# Patient Record
Sex: FEMALE | Race: WHITE | Employment: FULL TIME | URBAN - NONMETROPOLITAN AREA
[De-identification: names, ages, dates, MRNs, and addresses within clinical notes are randomized per-mention and may not be internally consistent; named-entity substitution may affect disease eponyms.]

---

## 2020-12-17 ENCOUNTER — OFFICE VISIT (OUTPATIENT)
Dept: OBGYN CLINIC | Age: 50
End: 2020-12-17
Payer: COMMERCIAL

## 2020-12-17 VITALS
DIASTOLIC BLOOD PRESSURE: 74 MMHG | SYSTOLIC BLOOD PRESSURE: 114 MMHG | WEIGHT: 202.6 LBS | HEIGHT: 68 IN | BODY MASS INDEX: 30.71 KG/M2

## 2020-12-17 PROCEDURE — 99396 PREV VISIT EST AGE 40-64: CPT | Performed by: OBSTETRICS & GYNECOLOGY

## 2020-12-17 NOTE — PROGRESS NOTES
DATE OF VISIT:  20  PATIENT NAME:  Tigre Beltran     YOB: 1970    53 y.o. Chief Complaint   Patient presents with    Gynecologic Exam     Pap NL 2018, Mammogram 2020. Pt denies concerns. No LMP recorded. Patient has had a hysterectomy. Primary Care Physician: Korina Sotomayor    The patient was seen and examined. She has no chiefcomplaint today and is here for her annual exam.  Her bowels are regular. There are no voiding complaints. She denies any bloating. She denies vaginal discharge and was counseled on STD's and the need for barriercontraception.      HPI : Tigre Beltran is a 53 y.o. female  who presents today for her annual.    ______________________________________________________________________    OB History    Para Term  AB Living   3 3       3   SAB TAB Ectopic Molar Multiple Live Births             3      # Outcome Date GA Lbr Abdirashid/2nd Weight Sex Delivery Anes PTL Lv   3 Para            2 Para            1 Para              Past Medical History:   Diagnosis Date    Abnormal Pap smear of cervix     history of cryo in [facility]    Thyroid disease                                                                    Past Surgical History:   Procedure Laterality Date    ANKLE SURGERY Left 2017    fracture    ARM SURGERY      HYSTERECTOMY, TOTAL ABDOMINAL      w/o BSO     Family History   Problem Relation Age of Onset    Heart Attack Father     Cervical Cancer Mother     Hypertension Brother      Social History     Socioeconomic History    Marital status:      Spouse name: Not on file    Number of children: Not on file    Years of education: Not on file    Highest education level: Not on file   Occupational History    Not on file   Social Needs    Financial resource strain: Not on file    Food insecurity     Worry: Not on file     Inability: Not on file    Transportation needs     Medical: Not on file Non-medical: Not on file   Tobacco Use    Smoking status: Never Smoker    Smokeless tobacco: Never Used   Substance and Sexual Activity    Alcohol use: Yes     Comment: occasional    Drug use: Never    Sexual activity: Yes     Partners: Male     Birth control/protection: Surgical     Comment: vasectomy   Lifestyle    Physical activity     Days per week: Not on file     Minutes per session: Not on file    Stress: Not on file   Relationships    Social connections     Talks on phone: Not on file     Gets together: Not on file     Attends Nondenominational service: Not on file     Active member of club or organization: Not on file     Attends meetings of clubs or organizations: Not on file     Relationship status: Not on file    Intimate partner violence     Fear of current or ex partner: Not on file     Emotionally abused: Not on file     Physically abused: Not on file     Forced sexual activity: Not on file   Other Topics Concern    Not on file   Social History Narrative    Not on file     Vitals:    12/17/20 0937   BP: 114/74   Site: Right Upper Arm   Position: Sitting   Weight: 202 lb 9.6 oz (91.9 kg)   Height: 5' 8\" (1.727 m)     Body mass index is 30.81 kg/m². No LMP recorded. Patient has had a hysterectomy. MEDICATIONS:  Current Outpatient Medications   Medication Sig Dispense Refill    Levothyroxine Sodium (SYNTHROID PO) Take by mouth       No current facility-administered medications for this visit. ALLERGIES:  Allergies as of 12/17/2020    (No Known Allergies)           Symptoms of decreased mood absent    **If either question is answered in a  positive fashion then completethe PHQ9 Scoring Evaluation and make the appropriate referral**      Gynecologic History:       No LMP recorded. Patient has had a hysterectomy. Sexually Active: Yes    STD History: No     Permanent Sterilization:Yes hyst   Reversible Birth Control: No        Hormone Replacement Exposure:  No Genetic Qualified Family History of Breast, Ovarian , Colon or Uterine Cancer:No   If YES see scanned worksheet. Preventative Health Testing:  Colposcopy History:   Date of Last Mammogram: 2020  Date of Last Colonoscopy:   Date of Last Bone Density:      ______________________________________________________________________    REVIEW OF SYSTEMS:       Review of Systems   Constitutional: Negative for chills, fatigue and fever. Respiratory: Negative for shortness of breath. Cardiovascular: Negative for chest pain. Gastrointestinal: Negative for abdominal pain, constipation and diarrhea. Genitourinary: Negative for dysuria, enuresis, frequency, menstrual problem, pelvic pain, urgency and vaginal bleeding. Neurological: Negative for dizziness, light-headedness and headaches. PHYSICAL EXAM:    Physical Exam  Constitutional:       Appearance: Normal appearance. Genitourinary:      Pelvic exam was performed with patient in the lithotomy position. Vulva, vagina, right adnexa and left adnexa normal.      Cervix is absent. Uterus is absent. HENT:      Head: Atraumatic. Mouth/Throat:      Mouth: Mucous membranes are moist.   Eyes:      Extraocular Movements: Extraocular movements intact. Pupils: Pupils are equal, round, and reactive to light. Neck:      Musculoskeletal: Normal range of motion. Cardiovascular:      Rate and Rhythm: Normal rate. Pulmonary:      Effort: Pulmonary effort is normal.   Chest:      Breasts:         Right: Normal.         Left: Normal.   Abdominal:      General: There is no distension. Palpations: Abdomen is soft. Tenderness: There is no abdominal tenderness. Musculoskeletal: Normal range of motion. Neurological:      Mental Status: She is alert and oriented to person, place, and time. Skin:     General: Skin is warm and dry.    Psychiatric:         Mood and Affect: Mood normal.         Behavior: Behavior normal. Thought Content: Thought content normal.         Judgment: Judgment normal.   Chaperone present: chaperone declined. POC Cultures:  No results found for this visit on 12/17/20. ASSESSMENT:      53 y.o. Annual  1. Women's annual routine gynecological examination          There are no active problems to display for this patient. Hereditary Breast, Ovarian, Colon and Uterine Cancer screening Done. Tobacco & Secondary smoke risks reviewed; instructed on cessation and avoidance    PLAN:    Return in about 1 year (around 12/17/2021) for annual.  No orders of the defined types were placed in this encounter. Repeat Annual every 1 year  Cervical Cytology Evaluation begins at 24years old. If Negative Cytology, Follow-up screening per current guidelines. Mammograms every 1year. If 35 yo and last mammogram was negative. Calcium and Vitamin D dosing reviewed. Colonoscopy screening reviewed as well as onset for bone density testing. Birth control and barrier recommendationsdiscussed. STD counseling and prevention reviewed. Gardisil counseling completed for all patients 7-35 yo. Routine healthmaintenance per patients PCP.     Electronicallysigned by:  Tone Albarran DO on 12/26/20

## 2020-12-26 ASSESSMENT — ENCOUNTER SYMPTOMS
DIARRHEA: 0
SHORTNESS OF BREATH: 0
ABDOMINAL PAIN: 0
CONSTIPATION: 0

## 2023-10-25 ENCOUNTER — OFFICE VISIT (OUTPATIENT)
Dept: OBGYN CLINIC | Age: 53
End: 2023-10-25
Payer: COMMERCIAL

## 2023-10-25 VITALS
SYSTOLIC BLOOD PRESSURE: 120 MMHG | BODY MASS INDEX: 41.44 KG/M2 | DIASTOLIC BLOOD PRESSURE: 88 MMHG | WEIGHT: 264 LBS | HEIGHT: 67 IN

## 2023-10-25 DIAGNOSIS — Z01.419 WOMEN'S ANNUAL ROUTINE GYNECOLOGICAL EXAMINATION: Primary | ICD-10-CM

## 2023-10-25 PROCEDURE — 99396 PREV VISIT EST AGE 40-64: CPT | Performed by: OBSTETRICS & GYNECOLOGY

## 2023-10-25 RX ORDER — ESCITALOPRAM OXALATE 10 MG/1
TABLET ORAL
COMMUNITY
Start: 2023-08-03

## 2023-10-25 RX ORDER — LEVOTHYROXINE SODIUM 75 MCG
TABLET ORAL
COMMUNITY
Start: 2023-08-29

## 2023-10-25 ASSESSMENT — ENCOUNTER SYMPTOMS
CONSTIPATION: 0
ABDOMINAL PAIN: 0
SHORTNESS OF BREATH: 0
DIARRHEA: 0

## 2023-10-25 NOTE — PROGRESS NOTES
YEARLY PHYSICAL    Date of service: 10/25/2023    Carmita Mix  Is a 47 y.o. female    PT's PCP is: Ana Castillo     : 1970                                         Chaperone for Intimate Exam  Chaperone was offered as part of the rooming process. Patient declined and agrees to continue with exam without a chaperone. Chaperone: N/A      Subjective:       No LMP recorded. Patient has had a hysterectomy. Are your menses regular: not applicable    OB History    Para Term  AB Living   3 3       3   SAB IAB Ectopic Molar Multiple Live Births             3      # Outcome Date GA Lbr Abdirashid/2nd Weight Sex Delivery Anes PTL Lv   3 Para            2 Para            1 Para                 Social History     Tobacco Use   Smoking Status Never   Smokeless Tobacco Never        Social History     Substance and Sexual Activity   Alcohol Use Yes    Comment: occasional       Family History   Problem Relation Age of Onset    Heart Attack Father     Cervical Cancer Mother     Hypertension Brother        Any family history of breast or ovarian cancer: No    Any family history of blood clots: No      Allergies: Patient has no known allergies.       Current Outpatient Medications:     SYNTHROID 75 MCG tablet, , Disp: , Rfl:     escitalopram (LEXAPRO) 10 MG tablet, , Disp: , Rfl:     Social History     Substance and Sexual Activity   Sexual Activity Yes    Partners: Male    Birth control/protection: Surgical    Comment: vasectomy       Any bleeding or pain with intercourse: No    Last Yearly:  2020    Last pap:  ([address] )    Last HPV:     Last Mammogram: 2023    Last Trina Mar Never    Last colorectal screen- type:colonoscopy  date      Do you do self breast exams: Yes    Past Medical History:   Diagnosis Date    Abnormal Pap smear of cervix     history of cryo in [facility]    Thyroid disease        Past Surgical History:

## 2024-10-30 ENCOUNTER — OFFICE VISIT (OUTPATIENT)
Dept: OBGYN CLINIC | Age: 54
End: 2024-10-30
Payer: COMMERCIAL

## 2024-10-30 VITALS
WEIGHT: 237 LBS | DIASTOLIC BLOOD PRESSURE: 82 MMHG | HEIGHT: 67 IN | SYSTOLIC BLOOD PRESSURE: 124 MMHG | BODY MASS INDEX: 37.2 KG/M2

## 2024-10-30 DIAGNOSIS — Z01.419 WOMEN'S ANNUAL ROUTINE GYNECOLOGICAL EXAMINATION: Primary | ICD-10-CM

## 2024-10-30 DIAGNOSIS — Z12.31 VISIT FOR SCREENING MAMMOGRAM: ICD-10-CM

## 2024-10-30 PROCEDURE — 99396 PREV VISIT EST AGE 40-64: CPT | Performed by: OBSTETRICS & GYNECOLOGY

## 2024-10-30 RX ORDER — SEMAGLUTIDE 0.5 MG/.5ML
INJECTION, SOLUTION SUBCUTANEOUS
COMMUNITY
Start: 2024-10-21

## 2024-10-30 ASSESSMENT — ENCOUNTER SYMPTOMS
CONSTIPATION: 0
SHORTNESS OF BREATH: 0
ABDOMINAL PAIN: 0
DIARRHEA: 0

## 2024-10-30 NOTE — PROGRESS NOTES
YEARLY PHYSICAL    Date of service: 10/30/2024    Lorna Gao  Is a 54 y.o.  , female    PT's PCP is: Vannessa Silvestre MD     : 1970                                             Subjective:       No LMP recorded. Patient has had a hysterectomy.     Are your menses regular: not applicable    OB History    Para Term  AB Living   3 3       3   SAB IAB Ectopic Molar Multiple Live Births             3      # Outcome Date GA Lbr Abdirashid/2nd Weight Sex Type Anes PTL Lv   3 Para            2 Para            1 Para                 Social History     Tobacco Use   Smoking Status Never   Smokeless Tobacco Never        Social History     Substance and Sexual Activity   Alcohol Use Yes    Comment: occasional       Family History   Problem Relation Age of Onset    Heart Attack Father     Cervical Cancer Mother     Hypertension Brother        Any family history of breast or ovarian cancer: No    Any family history of blood clots: No    Allergies: Patient has no known allergies.      Current Outpatient Medications:     WEGOVY 0.5 MG/0.5ML SOAJ SC injection, , Disp: , Rfl:     SYNTHROID 75 MCG tablet, , Disp: , Rfl:     Social History     Substance and Sexual Activity   Sexual Activity Yes    Partners: Male    Birth control/protection: Surgical    Comment: vasectomy       Any bleeding or pain with intercourse: No    Last Yearly date:  10/2023    Last pap date : No cervical cancer screening on file     results: 2018 normal    Last HPV date and results: 2018    Has pt ever had an abnormal:Yes    IF yes result and date: possibly when she was 19, nothing since     Mammogram Result (most recent):  No results found for this or any previous visit from the past 3650 days.        DEXA Result (most recent):  No results found for this or any previous visit from the past 3650 days.        Last colorectal screen- type:colonoscopy*   results normal    Do